# Patient Record
Sex: MALE | URBAN - METROPOLITAN AREA
[De-identification: names, ages, dates, MRNs, and addresses within clinical notes are randomized per-mention and may not be internally consistent; named-entity substitution may affect disease eponyms.]

---

## 2024-09-05 ENCOUNTER — TELEPHONE (OUTPATIENT)
Age: 69
End: 2024-09-05

## 2024-09-05 NOTE — TELEPHONE ENCOUNTER
Loly from [facility] called in to see/confirm if this patient was being seen here at [place] by SALVADOR Bain.  If he is a patient she wanted to set up a TCM/Follow Up release visit.    She did have 3 identifiers but we have only two.  I advised her that I could not confirm anything about patient.  We do not have full demographics as he was never fully registered.    If patient needs to be seen as a NP/FU release Loly will call back.  She said this is who patient listed.    This is just FYI Thank you